# Patient Record
Sex: MALE | URBAN - METROPOLITAN AREA
[De-identification: names, ages, dates, MRNs, and addresses within clinical notes are randomized per-mention and may not be internally consistent; named-entity substitution may affect disease eponyms.]

---

## 2019-08-19 ENCOUNTER — APPOINTMENT (RX ONLY)
Dept: URBAN - METROPOLITAN AREA CLINIC 158 | Facility: CLINIC | Age: 49
Setting detail: DERMATOLOGY
End: 2019-08-19

## 2019-08-19 DIAGNOSIS — D22 MELANOCYTIC NEVI: ICD-10-CM

## 2019-08-19 DIAGNOSIS — L82.0 INFLAMED SEBORRHEIC KERATOSIS: ICD-10-CM

## 2019-08-19 PROBLEM — D23.62 OTHER BENIGN NEOPLASM OF SKIN OF LEFT UPPER LIMB, INCLUDING SHOULDER: Status: ACTIVE | Noted: 2019-08-19

## 2019-08-19 PROBLEM — D22.62 MELANOCYTIC NEVI OF LEFT UPPER LIMB, INCLUDING SHOULDER: Status: ACTIVE | Noted: 2019-08-19

## 2019-08-19 PROBLEM — D22.5 MELANOCYTIC NEVI OF TRUNK: Status: ACTIVE | Noted: 2019-08-19

## 2019-08-19 PROBLEM — D22.61 MELANOCYTIC NEVI OF RIGHT UPPER LIMB, INCLUDING SHOULDER: Status: ACTIVE | Noted: 2019-08-19

## 2019-08-19 PROCEDURE — ? COUNSELING

## 2019-08-19 PROCEDURE — 99242 OFF/OP CONSLTJ NEW/EST SF 20: CPT | Mod: 25

## 2019-08-19 PROCEDURE — 17110 DESTRUCTION B9 LES UP TO 14: CPT

## 2019-08-19 PROCEDURE — ? LIQUID NITROGEN

## 2019-08-19 ASSESSMENT — LOCATION SIMPLE DESCRIPTION DERM
LOCATION SIMPLE: RIGHT UPPER BACK
LOCATION SIMPLE: RIGHT FOREARM
LOCATION SIMPLE: RIGHT CHEEK
LOCATION SIMPLE: LEFT FOREARM
LOCATION SIMPLE: LEFT UPPER BACK

## 2019-08-19 ASSESSMENT — LOCATION ZONE DERM
LOCATION ZONE: FACE
LOCATION ZONE: TRUNK
LOCATION ZONE: ARM

## 2019-08-19 ASSESSMENT — LOCATION DETAILED DESCRIPTION DERM
LOCATION DETAILED: RIGHT SUPERIOR LATERAL MALAR CHEEK
LOCATION DETAILED: LEFT MID-UPPER BACK
LOCATION DETAILED: RIGHT MID-UPPER BACK
LOCATION DETAILED: LEFT DISTAL DORSAL FOREARM
LOCATION DETAILED: RIGHT DISTAL DORSAL FOREARM

## 2020-08-19 ENCOUNTER — APPOINTMENT (RX ONLY)
Dept: URBAN - METROPOLITAN AREA CLINIC 158 | Facility: CLINIC | Age: 50
Setting detail: DERMATOLOGY
End: 2020-08-19

## 2020-08-19 DIAGNOSIS — L82.1 OTHER SEBORRHEIC KERATOSIS: ICD-10-CM

## 2020-08-19 DIAGNOSIS — L81.4 OTHER MELANIN HYPERPIGMENTATION: ICD-10-CM

## 2020-08-19 DIAGNOSIS — L57.0 ACTINIC KERATOSIS: ICD-10-CM

## 2020-08-19 DIAGNOSIS — D22 MELANOCYTIC NEVI: ICD-10-CM

## 2020-08-19 DIAGNOSIS — L21.8 OTHER SEBORRHEIC DERMATITIS: ICD-10-CM | Status: INADEQUATELY CONTROLLED

## 2020-08-19 PROBLEM — D22.4 MELANOCYTIC NEVI OF SCALP AND NECK: Status: ACTIVE | Noted: 2020-08-19

## 2020-08-19 PROBLEM — D22.62 MELANOCYTIC NEVI OF LEFT UPPER LIMB, INCLUDING SHOULDER: Status: ACTIVE | Noted: 2020-08-19

## 2020-08-19 PROBLEM — L23.7 ALLERGIC CONTACT DERMATITIS DUE TO PLANTS, EXCEPT FOOD: Status: ACTIVE | Noted: 2020-08-19

## 2020-08-19 PROBLEM — D22.61 MELANOCYTIC NEVI OF RIGHT UPPER LIMB, INCLUDING SHOULDER: Status: ACTIVE | Noted: 2020-08-19

## 2020-08-19 PROBLEM — D22.5 MELANOCYTIC NEVI OF TRUNK: Status: ACTIVE | Noted: 2020-08-19

## 2020-08-19 PROCEDURE — 11305 SHAVE SKIN LESION 0.5 CM/<: CPT

## 2020-08-19 PROCEDURE — 99214 OFFICE O/P EST MOD 30 MIN: CPT | Mod: 25

## 2020-08-19 PROCEDURE — 17000 DESTRUCT PREMALG LESION: CPT | Mod: 59

## 2020-08-19 PROCEDURE — ? SHAVE REMOVAL

## 2020-08-19 PROCEDURE — ? TREATMENT REGIMEN

## 2020-08-19 PROCEDURE — ? COUNSELING

## 2020-08-19 PROCEDURE — ? PRESCRIPTION

## 2020-08-19 PROCEDURE — ? LIQUID NITROGEN

## 2020-08-19 RX ORDER — FLUOCINOLONE ACETONIDE 0.25 MG/G
APPLY CREAM TOPICAL BID
Qty: 60 | Refills: 11 | Status: ERX | COMMUNITY
Start: 2020-08-19

## 2020-08-19 RX ADMIN — FLUOCINOLONE ACETONIDE APPLY: 0.25 CREAM TOPICAL at 00:00

## 2020-08-19 ASSESSMENT — LOCATION SIMPLE DESCRIPTION DERM
LOCATION SIMPLE: RIGHT EAR
LOCATION SIMPLE: LEFT SHOULDER
LOCATION SIMPLE: RIGHT HAND
LOCATION SIMPLE: RIGHT FOREARM
LOCATION SIMPLE: LEFT EAR
LOCATION SIMPLE: LEFT UPPER ARM
LOCATION SIMPLE: UPPER BACK
LOCATION SIMPLE: LEFT HAND
LOCATION SIMPLE: LEFT TEMPLE
LOCATION SIMPLE: CHEST
LOCATION SIMPLE: RIGHT CHEEK
LOCATION SIMPLE: NECK
LOCATION SIMPLE: LEFT CHEEK
LOCATION SIMPLE: LEFT FOREARM
LOCATION SIMPLE: RIGHT UPPER BACK
LOCATION SIMPLE: LEFT UPPER BACK
LOCATION SIMPLE: ABDOMEN
LOCATION SIMPLE: GLABELLA
LOCATION SIMPLE: RIGHT UPPER ARM
LOCATION SIMPLE: RIGHT SHOULDER

## 2020-08-19 ASSESSMENT — LOCATION DETAILED DESCRIPTION DERM
LOCATION DETAILED: RIGHT LATERAL INFERIOR CHEST
LOCATION DETAILED: LEFT ULNAR DORSAL HAND
LOCATION DETAILED: LEFT MEDIAL MALAR CHEEK
LOCATION DETAILED: SUPERIOR THORACIC SPINE
LOCATION DETAILED: RIGHT SUPERIOR LATERAL UPPER BACK
LOCATION DETAILED: RIGHT PROXIMAL POSTERIOR UPPER ARM
LOCATION DETAILED: LEFT INFERIOR UPPER BACK
LOCATION DETAILED: LEFT POSTERIOR SHOULDER
LOCATION DETAILED: RIGHT RADIAL DORSAL HAND
LOCATION DETAILED: LEFT DISTAL POSTERIOR UPPER ARM
LOCATION DETAILED: LEFT PROXIMAL POSTERIOR UPPER ARM
LOCATION DETAILED: RIGHT CENTRAL LATERAL NECK
LOCATION DETAILED: RIGHT DISTAL DORSAL FOREARM
LOCATION DETAILED: RIGHT POSTERIOR SHOULDER
LOCATION DETAILED: GLABELLA
LOCATION DETAILED: LEFT ANTERIOR SHOULDER
LOCATION DETAILED: LEFT CRUS OF HELIX
LOCATION DETAILED: EPIGASTRIC SKIN
LOCATION DETAILED: LEFT MID-UPPER BACK
LOCATION DETAILED: RIGHT INFERIOR UPPER BACK
LOCATION DETAILED: LEFT DISTAL DORSAL FOREARM
LOCATION DETAILED: LEFT SUPERIOR LATERAL UPPER BACK
LOCATION DETAILED: RIGHT MEDIAL MALAR CHEEK
LOCATION DETAILED: LEFT CENTRAL TEMPLE
LOCATION DETAILED: STERNUM
LOCATION DETAILED: LEFT LATERAL ABDOMEN
LOCATION DETAILED: RIGHT ANTERIOR PROXIMAL UPPER ARM
LOCATION DETAILED: RIGHT CYMBA CONCHA
LOCATION DETAILED: RIGHT DISTAL POSTERIOR UPPER ARM
LOCATION DETAILED: RIGHT INFERIOR LATERAL MALAR CHEEK

## 2020-08-19 ASSESSMENT — LOCATION ZONE DERM
LOCATION ZONE: HAND
LOCATION ZONE: FACE
LOCATION ZONE: NECK
LOCATION ZONE: EAR
LOCATION ZONE: TRUNK
LOCATION ZONE: ARM

## 2020-08-19 ASSESSMENT — SEVERITY ASSESSMENT: HOW SEVERE IS THIS PATIENT'S CONDITION?: MILD

## 2020-08-19 NOTE — PROCEDURE: LIQUID NITROGEN
Post-Care Instructions: I reviewed with the patient in detail post-care instructions. Patient is to wear sunprotection, and avoid picking at any of the treated lesions. Pt may apply Vaseline to crusted or scabbing areas.
Consent: The patient's consent was obtained including but not limited to risks of crusting, scabbing, blistering, scarring, darker or lighter pigmentary change, recurrence, incomplete removal and infection.
Duration Of Freeze Thaw-Cycle (Seconds): 5
Detail Level: Detailed
Render Post-Care Instructions In Note?: yes
Render Note In Bullet Format When Appropriate: No
Number Of Freeze-Thaw Cycles: 1 freeze-thaw cycle

## 2020-08-19 NOTE — PROCEDURE: SHAVE REMOVAL
Bill 97990 For Specimen Handling/Conveyance To Laboratory?: no
Detail Level: Detailed
Biopsy Method: Dermablade
Hemostasis: Yamil's
Medical Necessity Clause: This procedure was medically necessary because the lesion that was treated was:
Wound Care: Vaseline
Anesthesia Type: 1% lidocaine with epinephrine
Anesthesia Volume In Cc: 0.2
Size Of Lesion In Cm (Required): 0.3
Path Notes (To The Dermatopathologist): Please check margins.
Consent was obtained from the patient. The risks and benefits to therapy were discussed in detail. Specifically, the risks of infection, scarring, bleeding, prolonged wound healing, incomplete removal, allergy to anesthesia, nerve injury and recurrence were addressed. Prior to the procedure, the treatment site was clearly identified and confirmed by the patient. All components of Universal Protocol/PAUSE Rule completed.
Notification Instructions: Patient will be notified of biopsy results. However, patient instructed to call the office if not contacted within 2 weeks.
Was A Bandage Applied: Yes
X Size Of Lesion In Cm (Optional): 0
Billing Type: Third-Party Bill
Post-Care Instructions: I reviewed with the patient in detail post-care instructions. Patient is to keep the biopsy site dry overnight, and then apply bacitracin twice daily until healed. Patient may apply hydrogen peroxide soaks to remove any crusting.
Medical Necessity Information: It is in your best interest to select a reason for this procedure from the list below. All of these items fulfill various CMS LCD requirements except the new and changing color options.

## 2021-08-30 ENCOUNTER — APPOINTMENT (RX ONLY)
Dept: URBAN - METROPOLITAN AREA CLINIC 158 | Facility: CLINIC | Age: 51
Setting detail: DERMATOLOGY
End: 2021-08-30

## 2021-08-30 DIAGNOSIS — L81.4 OTHER MELANIN HYPERPIGMENTATION: ICD-10-CM

## 2021-08-30 DIAGNOSIS — L21.8 OTHER SEBORRHEIC DERMATITIS: ICD-10-CM | Status: WELL CONTROLLED

## 2021-08-30 DIAGNOSIS — L82.1 OTHER SEBORRHEIC KERATOSIS: ICD-10-CM

## 2021-08-30 DIAGNOSIS — L57.8 OTHER SKIN CHANGES DUE TO CHRONIC EXPOSURE TO NONIONIZING RADIATION: ICD-10-CM

## 2021-08-30 DIAGNOSIS — D18.0 HEMANGIOMA: ICD-10-CM

## 2021-08-30 DIAGNOSIS — D22 MELANOCYTIC NEVI: ICD-10-CM

## 2021-08-30 DIAGNOSIS — L82.0 INFLAMED SEBORRHEIC KERATOSIS: ICD-10-CM

## 2021-08-30 PROBLEM — D22.61 MELANOCYTIC NEVI OF RIGHT UPPER LIMB, INCLUDING SHOULDER: Status: ACTIVE | Noted: 2021-08-30

## 2021-08-30 PROBLEM — D22.5 MELANOCYTIC NEVI OF TRUNK: Status: ACTIVE | Noted: 2021-08-30

## 2021-08-30 PROBLEM — D22.62 MELANOCYTIC NEVI OF LEFT UPPER LIMB, INCLUDING SHOULDER: Status: ACTIVE | Noted: 2021-08-30

## 2021-08-30 PROBLEM — D18.01 HEMANGIOMA OF SKIN AND SUBCUTANEOUS TISSUE: Status: ACTIVE | Noted: 2021-08-30

## 2021-08-30 PROBLEM — L23.7 ALLERGIC CONTACT DERMATITIS DUE TO PLANTS, EXCEPT FOOD: Status: ACTIVE | Noted: 2021-08-30

## 2021-08-30 PROBLEM — J30.1 ALLERGIC RHINITIS DUE TO POLLEN: Status: ACTIVE | Noted: 2021-08-30

## 2021-08-30 PROCEDURE — ? TREATMENT REGIMEN

## 2021-08-30 PROCEDURE — ? PRESCRIPTION

## 2021-08-30 PROCEDURE — 17110 DESTRUCTION B9 LES UP TO 14: CPT

## 2021-08-30 PROCEDURE — 99214 OFFICE O/P EST MOD 30 MIN: CPT | Mod: 25

## 2021-08-30 PROCEDURE — ? LIQUID NITROGEN

## 2021-08-30 PROCEDURE — ? COUNSELING

## 2021-08-30 RX ORDER — FLUOCINOLONE ACETONIDE 0.25 MG/G
APPLY CREAM TOPICAL BID
Qty: 60 | Refills: 11 | Status: ERX | COMMUNITY
Start: 2021-08-30

## 2021-08-30 RX ADMIN — FLUOCINOLONE ACETONIDE APPLY: 0.25 CREAM TOPICAL at 00:00

## 2021-08-30 ASSESSMENT — LOCATION SIMPLE DESCRIPTION DERM
LOCATION SIMPLE: RIGHT CHEEK
LOCATION SIMPLE: RIGHT THIGH
LOCATION SIMPLE: RIGHT LOWER BACK
LOCATION SIMPLE: LEFT FOREARM
LOCATION SIMPLE: RIGHT EAR
LOCATION SIMPLE: LEFT EAR
LOCATION SIMPLE: GLABELLA
LOCATION SIMPLE: RIGHT UPPER ARM
LOCATION SIMPLE: LEFT LOWER BACK
LOCATION SIMPLE: RIGHT UPPER BACK
LOCATION SIMPLE: LEFT UPPER BACK
LOCATION SIMPLE: RIGHT FOREARM
LOCATION SIMPLE: ABDOMEN
LOCATION SIMPLE: RIGHT CALF
LOCATION SIMPLE: LEFT SHOULDER
LOCATION SIMPLE: LEFT CHEEK
LOCATION SIMPLE: LEFT CALF
LOCATION SIMPLE: LEFT UPPER ARM
LOCATION SIMPLE: LEFT THIGH
LOCATION SIMPLE: RIGHT PRETIBIAL REGION
LOCATION SIMPLE: CHEST
LOCATION SIMPLE: RIGHT POSTAURICULAR SKIN
LOCATION SIMPLE: SCALP
LOCATION SIMPLE: RIGHT SHOULDER

## 2021-08-30 ASSESSMENT — LOCATION DETAILED DESCRIPTION DERM
LOCATION DETAILED: LEFT ANTERIOR DISTAL THIGH
LOCATION DETAILED: RIGHT DISTAL POSTERIOR UPPER ARM
LOCATION DETAILED: RIGHT MEDIAL MALAR CHEEK
LOCATION DETAILED: GLABELLA
LOCATION DETAILED: LEFT ANTERIOR DISTAL UPPER ARM
LOCATION DETAILED: LEFT PROXIMAL POSTERIOR UPPER ARM
LOCATION DETAILED: RIGHT POSTERIOR SHOULDER
LOCATION DETAILED: EPIGASTRIC SKIN
LOCATION DETAILED: LEFT MEDIAL MALAR CHEEK
LOCATION DETAILED: LEFT POSTERIOR SHOULDER
LOCATION DETAILED: RIGHT MEDIAL UPPER BACK
LOCATION DETAILED: RIGHT INFERIOR MEDIAL MIDBACK
LOCATION DETAILED: LEFT INFERIOR LATERAL MIDBACK
LOCATION DETAILED: LEFT LATERAL SUPERIOR CHEST
LOCATION DETAILED: RIGHT DISTAL PRETIBIAL REGION
LOCATION DETAILED: LEFT DISTAL CALF
LOCATION DETAILED: PERIUMBILICAL SKIN
LOCATION DETAILED: RIGHT VENTRAL DISTAL FOREARM
LOCATION DETAILED: RIGHT CYMBA CONCHA
LOCATION DETAILED: RIGHT ANTERIOR PROXIMAL UPPER ARM
LOCATION DETAILED: RIGHT DISTAL CALF
LOCATION DETAILED: LEFT DISTAL DORSAL FOREARM
LOCATION DETAILED: LEFT CRUS OF HELIX
LOCATION DETAILED: LEFT INFERIOR UPPER BACK
LOCATION DETAILED: RIGHT INFERIOR POSTAURICULAR SKIN
LOCATION DETAILED: LEFT ANTERIOR PROXIMAL UPPER ARM
LOCATION DETAILED: RIGHT ANTERIOR DISTAL THIGH
LOCATION DETAILED: RIGHT ANTERIOR SHOULDER

## 2021-08-30 ASSESSMENT — LOCATION ZONE DERM
LOCATION ZONE: EAR
LOCATION ZONE: SCALP
LOCATION ZONE: LEG
LOCATION ZONE: FACE
LOCATION ZONE: TRUNK
LOCATION ZONE: ARM

## 2021-08-30 NOTE — PROCEDURE: LIQUID NITROGEN
Render Note In Bullet Format When Appropriate: No
Consent: The patient's consent was obtained including but not limited to risks of crusting, scabbing, blistering, scarring, darker or lighter pigmentary change, recurrence, incomplete removal and infection.
Medical Necessity Information: It is in your best interest to select a reason for this procedure from the list below. All of these items fulfill various CMS LCD requirements except the new and changing color options.
Detail Level: Detailed
Show Aperture Variable?: Yes
Medical Necessity Clause: This procedure was medically necessary because the lesions that were treated were:
Post-Care Instructions: I reviewed with the patient in detail post-care instructions. Patient is to wear sunprotection, and avoid picking at any of the treated lesions. Pt may apply Vaseline to crusted or scabbing areas.

## 2021-08-30 NOTE — PROCEDURE: TREATMENT REGIMEN
Continue Regimen: fluocinolone 0.025 % topical cream BID ( face)
Action 4: Continue
Detail Level: Zone

## 2022-08-30 ENCOUNTER — APPOINTMENT (RX ONLY)
Dept: URBAN - METROPOLITAN AREA CLINIC 158 | Facility: CLINIC | Age: 52
Setting detail: DERMATOLOGY
End: 2022-08-30

## 2022-08-30 VITALS — HEIGHT: 72 IN | WEIGHT: 235 LBS

## 2022-08-30 DIAGNOSIS — D22 MELANOCYTIC NEVI: ICD-10-CM

## 2022-08-30 DIAGNOSIS — L57.8 OTHER SKIN CHANGES DUE TO CHRONIC EXPOSURE TO NONIONIZING RADIATION: ICD-10-CM

## 2022-08-30 DIAGNOSIS — L81.4 OTHER MELANIN HYPERPIGMENTATION: ICD-10-CM

## 2022-08-30 DIAGNOSIS — L21.8 OTHER SEBORRHEIC DERMATITIS: ICD-10-CM | Status: WELL CONTROLLED

## 2022-08-30 DIAGNOSIS — L82.1 OTHER SEBORRHEIC KERATOSIS: ICD-10-CM

## 2022-08-30 PROBLEM — L40.0 PSORIASIS VULGARIS: Status: ACTIVE | Noted: 2022-08-30

## 2022-08-30 PROBLEM — D22.5 MELANOCYTIC NEVI OF TRUNK: Status: ACTIVE | Noted: 2022-08-30

## 2022-08-30 PROCEDURE — ? SUNSCREEN RECOMMENDATIONS

## 2022-08-30 PROCEDURE — ? TREATMENT REGIMEN

## 2022-08-30 PROCEDURE — 99213 OFFICE O/P EST LOW 20 MIN: CPT

## 2022-08-30 PROCEDURE — ? COUNSELING

## 2022-08-30 ASSESSMENT — LOCATION DETAILED DESCRIPTION DERM
LOCATION DETAILED: LEFT LATERAL TRAPEZIAL NECK
LOCATION DETAILED: RIGHT CLAVICULAR SKIN
LOCATION DETAILED: RIGHT SUPERIOR UPPER BACK
LOCATION DETAILED: LEFT LATERAL FRONTAL SCALP
LOCATION DETAILED: RIGHT SUPERIOR MEDIAL MIDBACK
LOCATION DETAILED: LEFT INFERIOR UPPER BACK
LOCATION DETAILED: LEFT CLAVICULAR NECK
LOCATION DETAILED: RIGHT CENTRAL PARIETAL SCALP
LOCATION DETAILED: LEFT ANTERIOR PROXIMAL UPPER ARM
LOCATION DETAILED: LEFT POSTERIOR SHOULDER
LOCATION DETAILED: LEFT SUPERIOR UPPER BACK
LOCATION DETAILED: RIGHT MEDIAL INFERIOR CHEST
LOCATION DETAILED: RIGHT LATERAL SUPERIOR CHEST
LOCATION DETAILED: PERIUMBILICAL SKIN

## 2022-08-30 ASSESSMENT — LOCATION SIMPLE DESCRIPTION DERM
LOCATION SIMPLE: RIGHT LOWER BACK
LOCATION SIMPLE: LEFT UPPER BACK
LOCATION SIMPLE: LEFT SCALP
LOCATION SIMPLE: POSTERIOR NECK
LOCATION SIMPLE: RIGHT UPPER BACK
LOCATION SIMPLE: LEFT SHOULDER
LOCATION SIMPLE: CHEST
LOCATION SIMPLE: LEFT ANTERIOR NECK
LOCATION SIMPLE: ABDOMEN
LOCATION SIMPLE: SCALP
LOCATION SIMPLE: LEFT UPPER ARM
LOCATION SIMPLE: RIGHT CLAVICULAR SKIN

## 2022-08-30 ASSESSMENT — LOCATION ZONE DERM
LOCATION ZONE: ARM
LOCATION ZONE: TRUNK
LOCATION ZONE: NECK
LOCATION ZONE: SCALP

## 2022-08-30 NOTE — PROCEDURE: TREATMENT REGIMEN
Detail Level: Zone
Continue Regimen: Patient using Fluocinolone cream and states he does not want further treatment. Per jasper Rosario to send more if needed

## 2024-11-14 ENCOUNTER — APPOINTMENT (RX ONLY)
Dept: URBAN - METROPOLITAN AREA CLINIC 183 | Facility: CLINIC | Age: 54
Setting detail: DERMATOLOGY
End: 2024-11-14

## 2024-11-14 VITALS — WEIGHT: 238 LBS | HEIGHT: 72 IN

## 2024-11-14 DIAGNOSIS — L57.8 OTHER SKIN CHANGES DUE TO CHRONIC EXPOSURE TO NONIONIZING RADIATION: ICD-10-CM

## 2024-11-14 DIAGNOSIS — L21.8 OTHER SEBORRHEIC DERMATITIS: ICD-10-CM | Status: WELL CONTROLLED

## 2024-11-14 DIAGNOSIS — L81.4 OTHER MELANIN HYPERPIGMENTATION: ICD-10-CM

## 2024-11-14 DIAGNOSIS — L82.1 OTHER SEBORRHEIC KERATOSIS: ICD-10-CM

## 2024-11-14 PROCEDURE — ? TREATMENT REGIMEN

## 2024-11-14 PROCEDURE — ? PRESCRIPTION

## 2024-11-14 PROCEDURE — 99214 OFFICE O/P EST MOD 30 MIN: CPT

## 2024-11-14 PROCEDURE — ? SUNSCREEN RECOMMENDATIONS

## 2024-11-14 PROCEDURE — ? COUNSELING

## 2024-11-14 RX ORDER — FLUOCINOLONE ACETONIDE 0.25 MG/G
0.025% CREAM TOPICAL BID PRN
Qty: 15 | Refills: 11 | Status: ERX | COMMUNITY
Start: 2024-11-14

## 2024-11-14 RX ADMIN — FLUOCINOLONE ACETONIDE 0.025%: 0.25 CREAM TOPICAL at 00:00

## 2024-11-14 ASSESSMENT — LOCATION DETAILED DESCRIPTION DERM
LOCATION DETAILED: LEFT CENTRAL EYEBROW
LOCATION DETAILED: RIGHT MEDIAL FRONTAL SCALP
LOCATION DETAILED: RIGHT DISTAL DORSAL FOREARM
LOCATION DETAILED: RIGHT MEDIAL INFERIOR CHEST
LOCATION DETAILED: LEFT CENTRAL MALAR CHEEK
LOCATION DETAILED: RIGHT PROXIMAL DORSAL FOREARM
LOCATION DETAILED: LEFT DISTAL DORSAL FOREARM
LOCATION DETAILED: RIGHT CENTRAL MALAR CHEEK
LOCATION DETAILED: NASAL SUPRATIP
LOCATION DETAILED: RIGHT CENTRAL EYEBROW
LOCATION DETAILED: RIGHT MEDIAL SUPERIOR CHEST
LOCATION DETAILED: RIGHT MEDIAL FOREHEAD
LOCATION DETAILED: RIGHT FOREHEAD
LOCATION DETAILED: LEFT PROXIMAL DORSAL FOREARM

## 2024-11-14 ASSESSMENT — LOCATION SIMPLE DESCRIPTION DERM
LOCATION SIMPLE: RIGHT CHEEK
LOCATION SIMPLE: LEFT CHEEK
LOCATION SIMPLE: NOSE
LOCATION SIMPLE: RIGHT FOREARM
LOCATION SIMPLE: LEFT FOREARM
LOCATION SIMPLE: RIGHT FOREHEAD
LOCATION SIMPLE: LEFT EYEBROW
LOCATION SIMPLE: CHEST
LOCATION SIMPLE: RIGHT SCALP
LOCATION SIMPLE: RIGHT EYEBROW

## 2024-11-14 ASSESSMENT — LOCATION ZONE DERM
LOCATION ZONE: FACE
LOCATION ZONE: ARM
LOCATION ZONE: SCALP
LOCATION ZONE: NOSE
LOCATION ZONE: TRUNK

## 2024-11-14 ASSESSMENT — SEVERITY ASSESSMENT: HOW SEVERE IS THIS PATIENT'S CONDITION?: MILD

## 2024-11-14 NOTE — PROCEDURE: SUNSCREEN RECOMMENDATIONS
General Sunscreen Counseling: I recommended a broad spectrum sunscreen with a SPF of 30 or higher.  I explained that SPF 30 sunscreens block approximately 97 percent of the sun's harmful rays.  Sunscreens should be applied at least 15 minutes prior to expected sun exposure and then every 2 hours after that as long as sun exposure continues. If swimming or exercising sunscreen should be reapplied every 45 minutes to an hour after getting wet or sweating.  One ounce, or the equivalent of a shot glass full of sunscreen, is adequate to protect the skin not covered by a bathing suit. I also recommended a lip balm with a sunscreen as well. Sun protective clothing can be used in lieu of sunscreen but must be worn the entire time you are exposed to the sun's rays.
Detail Level: Detailed
Hydroxyzine Pregnancy And Lactation Text: This medication is not safe during pregnancy and should not be taken. It is also excreted in breast milk and breast feeding isn't recommended.